# Patient Record
Sex: MALE | Race: AMERICAN INDIAN OR ALASKA NATIVE
[De-identification: names, ages, dates, MRNs, and addresses within clinical notes are randomized per-mention and may not be internally consistent; named-entity substitution may affect disease eponyms.]

---

## 2020-08-17 ENCOUNTER — HOSPITAL ENCOUNTER (EMERGENCY)
Dept: HOSPITAL 11 - JP.ED | Age: 45
Discharge: HOME | End: 2020-08-17
Payer: SELF-PAY

## 2020-08-17 DIAGNOSIS — S39.012A: Primary | ICD-10-CM

## 2020-08-17 DIAGNOSIS — Z91.030: ICD-10-CM
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
## 2020-08-17 DIAGNOSIS — J45.909: ICD-10-CM

## 2020-08-17 DIAGNOSIS — Z88.6: ICD-10-CM

## 2020-08-17 DIAGNOSIS — F17.210: ICD-10-CM

## 2020-08-17 DIAGNOSIS — Z88.5: ICD-10-CM

## 2020-08-17 PROCEDURE — 99283 EMERGENCY DEPT VISIT LOW MDM: CPT

## 2020-08-17 PROCEDURE — 96372 THER/PROPH/DIAG INJ SC/IM: CPT

## 2020-08-17 PROCEDURE — 72100 X-RAY EXAM L-S SPINE 2/3 VWS: CPT

## 2020-08-17 NOTE — EDM.PDOC
<Myles Hernandez DAVIN - Last Filed: 08/17/20 17:25>





ED HPI GENERAL MEDICAL PROBLEM





- General


Chief Complaint: Back Pain or Injury


Stated Complaint: LOWER BACK PAIN


Time Seen by Provider: 08/17/20 17:05


Source of Information: Reports: Patient, Family, RN


History Limitations: Reports: Other (no old records)





- History of Present Illness


INITIAL COMMENTS - FREE TEXT/NARRATIVE: 





46 yo male presents with abrupt onset of low back pain that began yesterday with

bending over. He has not had any bowel or bladder incontinence. He reports 

numbness of both legs. The left low back is more painful. Movement greatly 

increases his pain. Recently moved to [place] from [place]. Does not yet 

have a primary care provider. 


Onset: Sudden


Onset Date: 08/16/20


Duration: Day(s): (1), Constant


Location: Reports: Back, Radiates to (legs bilat, ? L > R)


Quality: Reports: Ache (in back)


Severity: Severe


Improves with: Reports: Immobilization


Worsens with: Reports: Movement


Context: Reports: Other (See HPI)


Associated Symptoms: Reports: Other (numbness of legs)


Treatments PTA: Reports: Other (see below) (none)


  ** Lower Back


Pain Score (Numeric/FACES): 10





- Related Data


                                    Allergies











Allergy/AdvReac Type Severity Reaction Status Date / Time


 


aspirin Allergy  Swelling Verified 08/17/20 16:46


 


bee venom protein (honey bee) Allergy  Respiratory Verified 08/17/20 16:46





   Depression  


 


morphine Allergy  Swelling Verified 08/17/20 16:46











Home Meds: 


                                    Home Meds





NK [No Known Home Meds]  08/17/20 [History]











Past Medical History


Cardiovascular History: Reports: MI


Respiratory History: Reports: Asthma


Musculoskeletal History: Reports: Back Pain, Chronic, Fracture, Other (See 

Below)


Other Musculoskeletal History: Fractured ankle, nose   Back injury x3.


Neurological History: Reports: Concussion


Psychiatric History: Reports: Depression





- Infectious Disease History


Infectious Disease History: Reports: Chicken Pox, Measles, Mumps





- Past Surgical History


HEENT Surgical History: Reports: Tonsillectomy





Social & Family History





- Tobacco Use


Smoking Status *Q: Current Every Day Smoker


Years of Tobacco use: 27


Packs/Tins Daily: 0.5


Used Tobacco, but Quit: No


Second Hand Smoke Exposure: Yes





- Caffeine Use


Caffeine Use: Reports: Coffee, Energy Drinks





- Alcohol Use


Days Per Week of Alcohol Use: 0





- Recreational Drug Use


Recreational Drug Use: No





ED ROS GENERAL





- Review of Systems


Review Of Systems: See Below


Constitutional: Reports: No Symptoms


GI/Abdominal: Denies: Nausea


: Denies: Incontinence, Urinary Retention


Musculoskeletal: Reports: Back Pain


Skin: Reports: No Symptoms


Neurological: Reports: Numbness (of legs)





ED EXAM,LOWER BACK PAIN/INJURY





- Physical Exam


Exam: See Below


Exam Limited By: No Limitations


General Appearance: Alert, WD/WN, No Apparent Distress


Back Exam: Normal Inspection, Decreased Range of Motion, Muscle Spasm (L low 

back), Paraspinal Tenderness (left lumbar).  No: Full Range of Motion, CVA 

Tenderness (R), CVA Tenderness (L), Vertebral Tenderness


Extremities: Normal Inspection, Normal Range of Motion, Non-Tender, No Pedal 

Edema


Neurological: Alert, Normal Mood/Affect, Oriented x 3.  No: No Motor/Sensory 

Deficits (subjective leg numbness bilaterally), Difficulty Walking


Psychiatric: Normal Affect, Normal Mood


Skin Exam: Warm, Dry, Intact, Normal Color, No Rash





Course





- Radiology Interpretation


Free Text/Narrative:: 





lumbar spine-





Departure





- Departure


Disposition: Home, Self-Care 01


Clinical Impression: 


Low back strain


Qualifiers:


 Encounter type: initial encounter Qualified Code(s): S39.012A - Strain of 

muscle, fascia and tendon of lower back, initial encounter








- Discharge Information


Instructions:  Acute Back Pain, Adult


Referrals: 


PCP,None [Primary Care Provider] - 


Forms:  ED Department Discharge


Care Plan Goals: 


Take a regular dose of ibuprofen or naproxen over the next several days, 

increase activity as tolerated and use hydrocodone for extra pain control if 

needed.  Limit lifting for the next several days, and recheck later this week if

not improving satisfactorily.  Return sooner if worsening such as weakness in 

the leg, incontinence, or other concerns.





Sepsis Event Note (ED)





- Evaluation


Sepsis Screening Result: No Definite Risk





<Ahmet Berrios - Last Filed: 08/17/20 20:35>





Course





- Vital Signs


Last Recorded V/S: 


                                Last Vital Signs











Temp  99.0 F   08/17/20 16:57


 


Pulse  85   08/17/20 16:57


 


Resp  14   08/17/20 16:57


 


BP  114/72   08/17/20 16:57


 


Pulse Ox  95   08/17/20 16:57














- Orders/Labs/Meds


Orders: 


                               Active Orders 24 hr











 Category Date Time Status


 


 Lumbar Spine 2 or 3V [CR] Stat Exams  08/17/20 17:24 Taken











Meds: 


Medications














Discontinued Medications














Generic Name Dose Route Start Last Admin





  Trade Name David  PRN Reason Stop Dose Admin


 


Hydrocodone Bitart/Acetaminophen  2 tab  08/17/20 17:15  08/17/20 17:38





  Norco 325-5 Mg  PO  08/17/20 17:16  2 tab





  ONETIME ONE   Administration


 


Cyclobenzaprine HCl  10 mg  08/17/20 17:14  08/17/20 17:38





  Flexeril  PO  08/17/20 17:15  10 mg





  ONETIME ONE   Administration


 


Ketorolac Tromethamine  60 mg  08/17/20 17:09  08/17/20 17:41





  Toradol  IM  08/17/20 17:10  60 mg





  ONETIME ONE   Administration


 


Lidocaine  700 mg  08/17/20 17:14  08/17/20 17:58





  Lidoderm 5%  TOP  08/17/20 17:15  700 mg





  ONETIME ONE   Administration














- Re-Assessments/Exams


Free Text/Narrative Re-Assessment/Exam: 





08/17/20 18:13


Patient care turned over from Dr. Hernandez pending back x-ray and response to 

medications.  He does feel better.  Lumbar spine x-ray shows some straightening 

of the lumbar curvature and some moderate arthritic changes but good 

intervertebral space and no fracture.  Patient will be discharged with 6 

additional hydrocodone for extra pain control, and a work note limiting no more 

than 10 pounds lifting for the next 3 days but he is encouraged to increase his 

activity as tolerated.  He will also continue with an anti-inflammatory.  

Recheck later this week if not improving satisfactorily, or return sooner if 

worsening or concerns.





Departure





- Departure


Time of Disposition: 18:48





Sepsis Event Note (ED)





- Focused Exam


Vital Signs: 


                                   Vital Signs











  Temp Pulse Resp BP Pulse Ox


 


 08/17/20 16:57  99.0 F  85  14  114/72  95


 


 08/17/20 16:34  99.0 F  85  14  114/72  95

## 2022-09-08 NOTE — CR
Lumbar Spine 2 or 3V

 

CLINICAL HISTORY: Injury, back pain

 

FINDINGS: The vertebral body heights are maintained. There is mild diffuse

spondylosis. There is a minimal retrolisthesis of L4 on L5. There is some mild

disc space narrowing diffusely. Transverse and spinous processes appear intact.

 

IMPRESSION: Mild degenerative disc changes with minimal spondylosis

 

Minimal retrolisthesis of L4 likely due to some facet disease Yes...

## 2023-09-19 ENCOUNTER — HOSPITAL ENCOUNTER (EMERGENCY)
Dept: HOSPITAL 7 - FB.ED | Age: 48
Discharge: HOME | End: 2023-09-19
Payer: SELF-PAY

## 2023-09-19 DIAGNOSIS — Z72.0: ICD-10-CM

## 2023-09-19 DIAGNOSIS — Z88.6: ICD-10-CM

## 2023-09-19 DIAGNOSIS — Z91.030: ICD-10-CM

## 2023-09-19 DIAGNOSIS — Z88.5: ICD-10-CM

## 2023-09-19 DIAGNOSIS — M54.42: Primary | ICD-10-CM
